# Patient Record
Sex: MALE | Race: BLACK OR AFRICAN AMERICAN | Employment: FULL TIME | URBAN - METROPOLITAN AREA
[De-identification: names, ages, dates, MRNs, and addresses within clinical notes are randomized per-mention and may not be internally consistent; named-entity substitution may affect disease eponyms.]

---

## 2021-03-10 ENCOUNTER — APPOINTMENT (OUTPATIENT)
Dept: GENERAL RADIOLOGY | Age: 31
End: 2021-03-10
Payer: COMMERCIAL

## 2021-03-10 ENCOUNTER — HOSPITAL ENCOUNTER (EMERGENCY)
Age: 31
Discharge: HOME | End: 2021-03-11
Payer: COMMERCIAL

## 2021-03-10 DIAGNOSIS — S62.664A CLOSED NONDISPLACED FRACTURE OF DISTAL PHALANX OF RIGHT RING FINGER, INITIAL ENCOUNTER: Primary | ICD-10-CM
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
## 2021-03-10 PROCEDURE — 99283 EMERGENCY DEPT VISIT LOW MDM: CPT

## 2021-03-10 PROCEDURE — 73630 X-RAY EXAM OF FOOT: CPT

## 2021-03-10 PROCEDURE — 73130 X-RAY EXAM OF HAND: CPT

## 2021-03-11 VITALS
TEMPERATURE: 97.3 F | RESPIRATION RATE: 16 BRPM | SYSTOLIC BLOOD PRESSURE: 129 MMHG | WEIGHT: 156 LBS | OXYGEN SATURATION: 98 % | BODY MASS INDEX: 23.11 KG/M2 | HEIGHT: 69 IN | HEART RATE: 67 BPM | DIASTOLIC BLOOD PRESSURE: 74 MMHG

## 2021-03-11 PROCEDURE — 6370000000 HC RX 637 (ALT 250 FOR IP): Performed by: NURSE PRACTITIONER

## 2021-03-11 RX ORDER — IBUPROFEN 800 MG/1
800 TABLET ORAL EVERY 8 HOURS PRN
Qty: 20 TABLET | Refills: 0 | Status: SHIPPED | OUTPATIENT
Start: 2021-03-11

## 2021-03-11 RX ORDER — IBUPROFEN 800 MG/1
800 TABLET ORAL ONCE
Status: COMPLETED | OUTPATIENT
Start: 2021-03-11 | End: 2021-03-11

## 2021-03-11 RX ADMIN — IBUPROFEN 800 MG: 800 TABLET, FILM COATED ORAL at 01:03

## 2021-03-11 ASSESSMENT — ENCOUNTER SYMPTOMS
SHORTNESS OF BREATH: 0
VOMITING: 0
CHEST TIGHTNESS: 0
DIARRHEA: 0
ABDOMINAL PAIN: 0
NAUSEA: 0

## 2021-03-11 NOTE — ED NOTES
Pt Discharged in stable condition, VSS, no signs of distress, discharge instructions and meds reviewed. Pt verbalizes understanding and states no further questions or concerns unaddressed.        Radames Riedel, RN  03/11/21 8203
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
## 2021-03-22 ENCOUNTER — TELEPHONE (OUTPATIENT)
Dept: ORTHOPEDIC SURGERY | Age: 31
End: 2021-03-22

## 2021-03-24 ENCOUNTER — OFFICE VISIT (OUTPATIENT)
Dept: ORTHOPEDIC SURGERY | Age: 31
End: 2021-03-24
Payer: COMMERCIAL

## 2021-03-24 VITALS — BODY MASS INDEX: 22.48 KG/M2 | HEIGHT: 70 IN | WEIGHT: 157 LBS | TEMPERATURE: 98 F

## 2021-03-24 DIAGNOSIS — S62.634A CLOSED MALLET FRACTURE OF DISTAL PHALANX OF RIGHT RING FINGER: Primary | ICD-10-CM

## 2021-03-24 PROCEDURE — 99203 OFFICE O/P NEW LOW 30 MIN: CPT | Performed by: ORTHOPAEDIC SURGERY

## 2021-03-24 NOTE — LETTER
[address] [place] [address]  Iggy Nichols [address] [place] [id number]  Phone: [phone]  Fax: [phone]    Celeste Brandt MD        March 24, 2021     Patient: Shavonne Pittman   YOB: 1990   Date of Visit: 3/24/2021       To Whom It May Concern: It is my medical opinion that Shavonne Pittman may work in splint up until day of surgery (date TBD). If you have any questions or concerns, please don't hesitate to call.     Sincerely,           Celeste Brandt MD

## 2021-03-24 NOTE — PROGRESS NOTES
This 36 y.o.  right hand dominant  is seen in referral for the ED at [facility] with a chief complaint of injury to their right ring finger, which was injured 2 weeks ago when caught between a box and a pallet. He noticed pain, swelling and deformity. He was evaluated at the [facility] and the patient was splinted and referred for hand/upper extremity evaluation and treatment. Evidently there was some difficulty scheduling his appointment due to a problem with his worker's compensation claim. There is no history of additional significant injury. Symptoms have not changed since the date of injury. The pain assessment has been reviewed and is correct. The patient's social history, past medical history, family history, medications, allergies and review of systems, entered 3/24/21,  have been reviewed, and dated and are recorded in the chart. On physical examination the patient is Height: 5' 10\" (177.8 cm) tall and weighs Weight: 157 lb (71.2 kg). Respirations are 18 per minute. The patient is well nourished, is oriented to time and place, demonstrates appropriate mood and affect as well as normal gait and station. There is mild soft tissue swelling present about the right ring finger. There is no discoloration. There is a mallet finger deformity. Tenderness is present on palpation in the area of the right ring finger, dorsal, DIP joint. Active  extension of the ring finger is limited only on the right and is accompanied by pain. Skin is intact, as is distal circulation and sensation. Gross muscle strength is limited only on the right   Hand and wrist joints are stable. There are no subcutaneous nodules or enlarged epitrochlear lymph nodes. Xrays: Review of outside Xrays of the right hand demonstrate a displaced large fragment mallet fracture of the ring finger.   Suboptimal positioning makes radiological evaluation somewhat difficult. Impression: Displaced large fragment mallet fracture right ring finger. The nature of this medical problem is fully discussed with the patient, including all treatment options. All questions are answered. The finger is splinted in full extension with a dorsal padded aluminum splint. Post splinting AP and lateral Xrays of the right ring finger done in the office today, demonstrate that unfortunately the DIP joint has subluxed. The Xrays are shown to the patient. The nature of their medical problem is again fully discussed with the patient, including all treatment options. Surgery is also discussed, including the possible risks, complications, prognosis and postoperative care. All questions are answered. The surgery consent form is explained and signed. Surgery will be scheduled and the patient is asked to call me if there are any additional questions. Furthermore, the patient is advised that surgery to repair their injury may not result in return of normal function. However, the result of repairing the injury will usually result in better function than not repairing the injury. The patient understands that the surgery will be done by Dr. Ismael Hussein.

## 2021-03-24 NOTE — LETTER
[facility] - HAND SURGERY  Surgery Scheduling Form:      DEMOGRAPHICS:                                                                                                                Patient Name:  Abilio Tejada  Patient :  1990   Patient SS#:  [id number]    Patient Phone:  [phone] (home)    Patient Address:  [address] [place] [address]    PCP:  No primary care provider on file. Insurance:  WORKERS COMP CLAIM    Insurance ID Number:   [id number].O.I.   [id number]  DIAGNOSIS & PROCEDURE:                                                                                              Diagnosis:   right ring finger Distal Phalanx displaced large fragment Mallet Fracture with joint subluxation  (816.00)     J88.139Z  Operation:  Open Reduction and Internal Fixation of right ring finger Displaced Large Fragment Phalanx Mallet Fracture with joint subluxation  [CPT: 18870]  Location:     [employer]  Surgeon:  Bhargav Taylor    SCHEDULING INFORMATION:                                                                                         .  Surgeon's Scheduling Instruction:  within 7 days    RN Post-op Appt:  [] Yes   [x] No  Preferred Thursday:   [] Yes   [] No    Requested Date:    OR Time:  2:00 Patient Arrival Time:  12:00    OR Time Required:  45  Minutes  Anesthesia:  General                              COVID:    RAPID  Equipment: BIOMET HAND FRACTURE SYSTEM, K-Wires, TPS  Mini C-Arm:  Yes   Standard C-Arm:  No  Status:  outpatient  PAT Required:  Yes  Comments:                      Leigh Bains. Travon Donato MD  3/24/21   BILLING INFORMATION:                                                                                                     Procedure:       CPT Code Modifier  Open Reduction and Internal Fixation of right ring finger Displaced Large Fragment Phalanx Mallet Fracture with joint subluxation        .  91694        Pre Operative Physician Prophylaxis Orders - SCIP Protocols      Pre-Operative Antibiotic Order:    No Known Allergies       []  ----  No Antibiotic Ordered       [x]  ----  Give the following Antibiotic within 1 hour prior to start time:         Ancef 1 gram IV if patient is less than 200 pounds    or       Ancef 2 grams IV if patient is greater than 200 pounds    or      Vancomycin 1 gram IV (over 1 hour) if patient is allergic to           PENICILLINS or CEFALOSPORINS        SURG  4-1                        COVID   RAPID            H&P    USE ATTACHED ER REPORT    Procedure:  Open Reduction and Internal Fixation of right ring finger Displaced Large Fragment Phalanx Mallet Fracture with joint subluxation      Patient: Lilian Re  :    1990    Physician Signature:     Date: 3/24/21  Time: 3:22 PM

## 2021-03-24 NOTE — LETTER
CONSENT TO OPERATION  AND/OR OTHER PROCEDURE(S)          PATIENT : Lilian Grimaldo   YOB: 1990      DATE : 3/24/21          1. I request and consent that Dr. Germán Feldman. Kurt Quinn,  and/or his associates or assistants perform an operation and/or procedures on the above patient at  [place] [address], to treat the condition(s) which appear indicated by the diagnostic studies already performed. I have been told that in general terms the nature, purpose and reasonable expectations of the operation and/or procedure(s) are:     Open Reduction Internal Fixation Displaced/Subluxed  Mallet Fracture Right Ring Finger      2. It has been explained to me by the informing physician that during the course of the operation and/or procedure(s) unforeseen conditions may be revealed that necessitate an extension of the original operation and/or procedure(s) or different operation and/or procedures than those set forth in Paragraph 1. I therefore authorize and request that my physician and/or his associates or assistants perform such operations and/or procedures as are necessary and desirable in the exercise of professional judgment. The authority granted under this Paragraph 2 shall extend to all conditions that require treatment and are known to my physician at the time the operation is commenced. 3. I have been made aware by the informing physician of certain risks and consequences that are associated with the operation and/or procedure(s) described in Paragraph 1, the reasonable alternative methods or treatment, the possible consequences, the possibility that the operation and/or procedure(s) may be unsuccessful and the possibility of complications.   I understand the reasonably known risks to be:      - Bleeding  - Infection  - Poor Healing  - Possible Damage to Nerve, Vessel, Tendon/Muscle or Bone  - Need for further Treatment/Surgery  - Stiffness  - Pain  - Residual or Recurrent Symptoms  - Anesthetic and/or Medical Risks  - We have discussed the specific limitations and risks of hospital and/or office based treatment at this time due to the COVID-19 pandemic                I have been counseled about the risks of [facility] Covid-19 in the denis-operative and post-operative periods related to this procedure. I have been made aware that [facility] Covid-19 around the time of a surgical procedure may worsen my prognosis for recovering from the virus and lend to a higher morbidity and or mortality risk. With this knowledge, I have requested to proceed with the procedure as scheduled. 4. I have also been informed by the informing physician that there are other risks from both known and unknown causes that are attendant to the performance of any surgical procedure. I am aware that the practice of medicine and surgery is not an exact science, and that no guarantees have been made to me concerning the results of the operation and/or procedure(s). 5. I   CONSENT / REFUSE CONSENT  (strike the phrase that does not apply) to the taking of photographs before, during and/or after the operation or procedure for scientific/educational purposes. 6. I consent to the administration of anesthesia and to the use of such anesthetics as may be deemed advisable by the anesthesiologist who has been engaged by me or my physician.     7. I certify that I have read and understand the above consent to operation and/or other procedure(s); that the explanations therein referred to were made to me by the informing physician in advance of my signing this consent; that all blanks or statements requiring insertion or completion were filled in and inapplicable paragraphs, if any, were stricken before I signed; and that all questions asked by me about the operation and/or procedure(s) which I have consented to have been fully answered in a satisfactory manner.                                 _______________________ 3/24/21                              Witness     Signature Of Patient         Date        Lavell Thibodeaux                                                 Informing Physician                                           Signature of Informing Physician                              If patient is unable to sign or is a minor, complete one of the following:    (A)  Patient is a minor   years of age. (B)  Patient is unable to sign because: The undersigned represents that he or she is duly authorized to execute this consent for and on behalf of the above named patient. Witness               o  Parent  o  Guardian   o  Spouse       o  Other (specify)                                                          Patient Name: Shavonne Pittman  Patient YOB: 1990  Dr. Sudeep Carrillo' Return To Work Policy  Regarding your ability to return to work after surgery or injury, Dr. Sudeep Carrillo will not state that any patient is off of work or cannot work at all. He will place you on restrictions after your surgical procedure or injury. This means that you will be allowed to return to work the day after your office visit or surgery with restrictions. Depending on the details of your particular situation, Dr. Sudeep Carrillo may state that you will have either light use or no use of your hand for a specific number of weeks. It is your obligation to communicate with your employer regarding your restrictions. It is your employer's decision as to whether they will accommodate your restrictions (i.e. allow you to come to work in your restricted capacity) or to not allow you to return to work under your restrictions. Dr. Sudeep Carrillo does not participate in making this decision and cannot influence your employer regarding their decision. If you do not communicate your restrictions to your employer, or if you do not present to work as you are scheduled to, Dr. Sudeep Carrillo will not provide an 'excuse' to explain your absence.   A doctors

## 2021-03-30 ENCOUNTER — ANESTHESIA EVENT (OUTPATIENT)
Dept: OPERATING ROOM | Age: 31
End: 2021-03-30
Payer: COMMERCIAL

## 2021-03-30 NOTE — PROGRESS NOTES
Phone message left to call PAT dept at [phone]  for history review and surgery instructions on 3/30/21 @ 2311

## 2021-03-31 NOTE — PROGRESS NOTES
[facility] time____0600________        Surgery time___0730_________    Take the following medications with a sip of water: Follow your MD/Surgeons pre-procedure instructions regarding your medications    Do not eat or drink anything after 12:00 midnight prior to your surgery. This includes water chewing gum, mints and ice chips. You may brush your teeth and gargle the morning of your surgery, but do not swallow the water     Please see your family doctor/pediatrician for a history and physical and/or concerning medications. Bring any test results/reports from your physicians office. If you are under the care of a heart doctor or specialist doctor, please be aware that you may be asked to them for clearance    You may be asked to stop blood thinners such as Coumadin, Plavix, Fragmin, Lovenox, etc., or any anti-inflammatories such as:  Aspirin, Ibuprofen, Advil, Naproxen prior to your surgery. We also ask that you stop any OTC medications such as fish oil, vitamin E, glucosamine, garlic, Multivitamins, COQ 10, etc.    We ask that you do not smoke 24 hours prior to surgery  We ask that you do not  drink any alcoholic beverages 24 hours prior to surgery     You must make arrangements for a responsible adult to take you home after your surgery. For your safety you will not be allowed to leave alone or drive yourself home. Your surgery will be cancelled if you do not have a ride home. Also for your safety, it is strongly suggested that someone stay with you the first 24 hours after your surgery. A parent or legal guardian must accompany a child scheduled for surgery and plan to stay at the hospital until the child is discharged. Please do not bring other children with you. For your comfort, please wear simple loose fitting clothing to the hospital.  Please do not bring valuables.     Do not wear any make-up or nail polish on your fingers or toes      For your safety, please do not wear any jewelry or body piercing's on the day of surgery. All jewelry must be removed. If you have dentures, they will be removed before going to operating room. For your convenience, we will provide you with a container. If you wear contact lenses or glasses, they will be removed, please bring a case for them. If you have a living will and a durable power of  for healthcare, please bring in a copy. As part of our patient safety program to minimize surgical site infections, we ask you to do the following:    · Please notify your surgeon if you develop any illness between         now and the  day of your surgery. · This includes a cough, cold, fever, sore throat, nausea,         or vomiting, and diarrhea, etc.  ·  Please notify your surgeon if you experience dizziness, shortness         of breath or blurred vision between now and the time of your surgery. Do not shave your operative site 96 hours prior to surgery. For face and neck surgery, men may use an electric razor 48 hours   prior to surgery. You may shower the night before surgery or the morning of   your surgery with an antibacterial soap. You will need to bring a photo ID and insurance card    [facility] has an onsite pharmacy, would you like to utilize our pharmacy     If you will be staying overnight and use a C-pap machine, please bring   your C-pap to hospital     Our goal is to provide you with excellent care, therefore, visitors will be limited to two(2) in the room at a time so that we may focus on providing this care for you. Please contact pre-admission testing if you have any further questions. [facility] phone number:  [address] PAT fax number:  [phone]  Please note these are generalized instructions for all surgical cases, you may be provided with more specific instructions according to your surgery.

## 2021-03-31 NOTE — PROGRESS NOTES

## 2021-04-01 ENCOUNTER — APPOINTMENT (OUTPATIENT)
Dept: GENERAL RADIOLOGY | Age: 31
End: 2021-04-01
Attending: ORTHOPAEDIC SURGERY
Payer: COMMERCIAL

## 2021-04-01 ENCOUNTER — ANESTHESIA (OUTPATIENT)
Dept: OPERATING ROOM | Age: 31
End: 2021-04-01
Payer: COMMERCIAL

## 2021-04-01 ENCOUNTER — HOSPITAL ENCOUNTER (OUTPATIENT)
Age: 31
Setting detail: OUTPATIENT SURGERY
Discharge: HOME | End: 2021-04-01
Attending: ORTHOPAEDIC SURGERY | Admitting: ORTHOPAEDIC SURGERY
Payer: COMMERCIAL

## 2021-04-01 VITALS
TEMPERATURE: 97 F | OXYGEN SATURATION: 98 % | SYSTOLIC BLOOD PRESSURE: 122 MMHG | BODY MASS INDEX: 22.48 KG/M2 | WEIGHT: 157 LBS | RESPIRATION RATE: 16 BRPM | HEIGHT: 70 IN | HEART RATE: 60 BPM | DIASTOLIC BLOOD PRESSURE: 70 MMHG

## 2021-04-01 VITALS
OXYGEN SATURATION: 99 % | RESPIRATION RATE: 7 BRPM | TEMPERATURE: 96.8 F | SYSTOLIC BLOOD PRESSURE: 109 MMHG | DIASTOLIC BLOOD PRESSURE: 55 MMHG

## 2021-04-01 DIAGNOSIS — S62.634A CLOSED MALLET FRACTURE OF DISTAL PHALANX OF RIGHT RING FINGER: Primary | ICD-10-CM

## 2021-04-01 LAB — SARS-COV-2, NAAT: NOT DETECTED

## 2021-04-01 PROCEDURE — 3700000000 HC ANESTHESIA ATTENDED CARE: Performed by: ORTHOPAEDIC SURGERY

## 2021-04-01 PROCEDURE — 6360000002 HC RX W HCPCS: Performed by: NURSE ANESTHETIST, CERTIFIED REGISTERED

## 2021-04-01 PROCEDURE — 2500000003 HC RX 250 WO HCPCS: Performed by: NURSE ANESTHETIST, CERTIFIED REGISTERED

## 2021-04-01 PROCEDURE — 7100000010 HC PHASE II RECOVERY - FIRST 15 MIN: Performed by: ORTHOPAEDIC SURGERY

## 2021-04-01 PROCEDURE — 6360000002 HC RX W HCPCS: Performed by: ORTHOPAEDIC SURGERY

## 2021-04-01 PROCEDURE — C1713 ANCHOR/SCREW BN/BN,TIS/BN: HCPCS | Performed by: ORTHOPAEDIC SURGERY

## 2021-04-01 PROCEDURE — 2580000003 HC RX 258: Performed by: ORTHOPAEDIC SURGERY

## 2021-04-01 PROCEDURE — 7100000000 HC PACU RECOVERY - FIRST 15 MIN: Performed by: ORTHOPAEDIC SURGERY

## 2021-04-01 PROCEDURE — 3209999900 FLUORO FOR SURGICAL PROCEDURES

## 2021-04-01 PROCEDURE — 6360000002 HC RX W HCPCS: Performed by: ANESTHESIOLOGY

## 2021-04-01 PROCEDURE — 3600000005 HC SURGERY LEVEL 5 BASE: Performed by: ORTHOPAEDIC SURGERY

## 2021-04-01 PROCEDURE — 2580000003 HC RX 258: Performed by: ANESTHESIOLOGY

## 2021-04-01 PROCEDURE — 7100000011 HC PHASE II RECOVERY - ADDTL 15 MIN: Performed by: ORTHOPAEDIC SURGERY

## 2021-04-01 PROCEDURE — 2709999900 HC NON-CHARGEABLE SUPPLY: Performed by: ORTHOPAEDIC SURGERY

## 2021-04-01 PROCEDURE — 7100000001 HC PACU RECOVERY - ADDTL 15 MIN: Performed by: ORTHOPAEDIC SURGERY

## 2021-04-01 PROCEDURE — 6370000000 HC RX 637 (ALT 250 FOR IP): Performed by: ANESTHESIOLOGY

## 2021-04-01 PROCEDURE — 87635 SARS-COV-2 COVID-19 AMP PRB: CPT

## 2021-04-01 PROCEDURE — 73140 X-RAY EXAM OF FINGER(S): CPT

## 2021-04-01 PROCEDURE — 3600000015 HC SURGERY LEVEL 5 ADDTL 15MIN: Performed by: ORTHOPAEDIC SURGERY

## 2021-04-01 PROCEDURE — 3700000001 HC ADD 15 MINUTES (ANESTHESIA): Performed by: ORTHOPAEDIC SURGERY

## 2021-04-01 DEVICE — IMPLANTABLE DEVICE: Type: IMPLANTABLE DEVICE | Site: HAND | Status: FUNCTIONAL

## 2021-04-01 DEVICE — WIRE FIXATION .045IN 5IN C-WIRE SS SPADE: Type: IMPLANTABLE DEVICE | Site: HAND | Status: FUNCTIONAL

## 2021-04-01 RX ORDER — FENTANYL CITRATE 50 UG/ML
25 INJECTION, SOLUTION INTRAMUSCULAR; INTRAVENOUS EVERY 5 MIN PRN
Status: DISCONTINUED | OUTPATIENT
Start: 2021-04-01 | End: 2021-04-01 | Stop reason: HOSPADM

## 2021-04-01 RX ORDER — DIPHENHYDRAMINE HYDROCHLORIDE 50 MG/ML
25 INJECTION INTRAMUSCULAR; INTRAVENOUS ONCE
Status: COMPLETED | OUTPATIENT
Start: 2021-04-01 | End: 2021-04-01

## 2021-04-01 RX ORDER — SODIUM CHLORIDE 0.9 % (FLUSH) 0.9 %
10 SYRINGE (ML) INJECTION PRN
Status: DISCONTINUED | OUTPATIENT
Start: 2021-04-01 | End: 2021-04-01 | Stop reason: HOSPADM

## 2021-04-01 RX ORDER — ONDANSETRON 2 MG/ML
INJECTION INTRAMUSCULAR; INTRAVENOUS PRN
Status: DISCONTINUED | OUTPATIENT
Start: 2021-04-01 | End: 2021-04-01 | Stop reason: SDUPTHER

## 2021-04-01 RX ORDER — PROPOFOL 10 MG/ML
INJECTION, EMULSION INTRAVENOUS PRN
Status: DISCONTINUED | OUTPATIENT
Start: 2021-04-01 | End: 2021-04-01 | Stop reason: SDUPTHER

## 2021-04-01 RX ORDER — SODIUM CHLORIDE 0.9 % (FLUSH) 0.9 %
10 SYRINGE (ML) INJECTION EVERY 12 HOURS SCHEDULED
Status: DISCONTINUED | OUTPATIENT
Start: 2021-04-01 | End: 2021-04-01 | Stop reason: HOSPADM

## 2021-04-01 RX ORDER — OXYCODONE HYDROCHLORIDE AND ACETAMINOPHEN 5; 325 MG/1; MG/1
1 TABLET ORAL PRN
Status: COMPLETED | OUTPATIENT
Start: 2021-04-01 | End: 2021-04-01

## 2021-04-01 RX ORDER — FENTANYL CITRATE 50 UG/ML
INJECTION, SOLUTION INTRAMUSCULAR; INTRAVENOUS PRN
Status: DISCONTINUED | OUTPATIENT
Start: 2021-04-01 | End: 2021-04-01 | Stop reason: SDUPTHER

## 2021-04-01 RX ORDER — MIDAZOLAM HYDROCHLORIDE 1 MG/ML
INJECTION INTRAMUSCULAR; INTRAVENOUS PRN
Status: DISCONTINUED | OUTPATIENT
Start: 2021-04-01 | End: 2021-04-01 | Stop reason: SDUPTHER

## 2021-04-01 RX ORDER — OXYCODONE HYDROCHLORIDE AND ACETAMINOPHEN 5; 325 MG/1; MG/1
2 TABLET ORAL PRN
Status: COMPLETED | OUTPATIENT
Start: 2021-04-01 | End: 2021-04-01

## 2021-04-01 RX ORDER — SODIUM CHLORIDE 9 MG/ML
INJECTION, SOLUTION INTRAVENOUS CONTINUOUS
Status: DISCONTINUED | OUTPATIENT
Start: 2021-04-01 | End: 2021-04-01 | Stop reason: HOSPADM

## 2021-04-01 RX ORDER — ONDANSETRON 2 MG/ML
4 INJECTION INTRAMUSCULAR; INTRAVENOUS
Status: DISCONTINUED | OUTPATIENT
Start: 2021-04-01 | End: 2021-04-01 | Stop reason: HOSPADM

## 2021-04-01 RX ORDER — HYDROCODONE BITARTRATE AND ACETAMINOPHEN 5; 325 MG/1; MG/1
1 TABLET ORAL EVERY 6 HOURS PRN
Qty: 20 TABLET | Refills: 0 | Status: SHIPPED | OUTPATIENT
Start: 2021-04-01 | End: 2021-04-08

## 2021-04-01 RX ORDER — MAGNESIUM HYDROXIDE 1200 MG/15ML
LIQUID ORAL CONTINUOUS PRN
Status: COMPLETED | OUTPATIENT
Start: 2021-04-01 | End: 2021-04-01

## 2021-04-01 RX ORDER — LIDOCAINE HYDROCHLORIDE 20 MG/ML
INJECTION, SOLUTION EPIDURAL; INFILTRATION; INTRACAUDAL; PERINEURAL PRN
Status: DISCONTINUED | OUTPATIENT
Start: 2021-04-01 | End: 2021-04-01 | Stop reason: SDUPTHER

## 2021-04-01 RX ORDER — MEPERIDINE HYDROCHLORIDE 25 MG/ML
12.5 INJECTION INTRAMUSCULAR; INTRAVENOUS; SUBCUTANEOUS ONCE
Status: COMPLETED | OUTPATIENT
Start: 2021-04-01 | End: 2021-04-01

## 2021-04-01 RX ORDER — DEXAMETHASONE SODIUM PHOSPHATE 4 MG/ML
INJECTION, SOLUTION INTRA-ARTICULAR; INTRALESIONAL; INTRAMUSCULAR; INTRAVENOUS; SOFT TISSUE PRN
Status: DISCONTINUED | OUTPATIENT
Start: 2021-04-01 | End: 2021-04-01 | Stop reason: SDUPTHER

## 2021-04-01 RX ADMIN — SODIUM CHLORIDE: 9 INJECTION, SOLUTION INTRAVENOUS at 07:04

## 2021-04-01 RX ADMIN — FENTANYL CITRATE 50 MCG: 50 INJECTION INTRAMUSCULAR; INTRAVENOUS at 07:44

## 2021-04-01 RX ADMIN — MEPERIDINE HYDROCHLORIDE 12.5 MG: 25 INJECTION INTRAMUSCULAR; INTRAVENOUS; SUBCUTANEOUS at 08:40

## 2021-04-01 RX ADMIN — MIDAZOLAM 2 MG: 1 INJECTION INTRAMUSCULAR; INTRAVENOUS at 07:22

## 2021-04-01 RX ADMIN — CEFAZOLIN 2000 MG: 10 INJECTION, POWDER, FOR SOLUTION INTRAVENOUS at 07:22

## 2021-04-01 RX ADMIN — DIPHENHYDRAMINE HYDROCHLORIDE 25 MG: 50 INJECTION, SOLUTION INTRAMUSCULAR; INTRAVENOUS at 10:07

## 2021-04-01 RX ADMIN — FENTANYL CITRATE 50 MCG: 50 INJECTION INTRAMUSCULAR; INTRAVENOUS at 07:39

## 2021-04-01 RX ADMIN — HYDROMORPHONE HYDROCHLORIDE 0.5 MG: 1 INJECTION, SOLUTION INTRAMUSCULAR; INTRAVENOUS; SUBCUTANEOUS at 08:44

## 2021-04-01 RX ADMIN — OXYCODONE HYDROCHLORIDE AND ACETAMINOPHEN 2 TABLET: 5; 325 TABLET ORAL at 10:07

## 2021-04-01 RX ADMIN — ONDANSETRON 4 MG: 2 INJECTION INTRAMUSCULAR; INTRAVENOUS at 07:50

## 2021-04-01 RX ADMIN — HYDROMORPHONE HYDROCHLORIDE 0.5 MG: 1 INJECTION, SOLUTION INTRAMUSCULAR; INTRAVENOUS; SUBCUTANEOUS at 08:39

## 2021-04-01 RX ADMIN — HYDROMORPHONE HYDROCHLORIDE 0.5 MG: 1 INJECTION, SOLUTION INTRAMUSCULAR; INTRAVENOUS; SUBCUTANEOUS at 08:52

## 2021-04-01 RX ADMIN — PROPOFOL 250 MG: 10 INJECTION, EMULSION INTRAVENOUS at 07:27

## 2021-04-01 RX ADMIN — HYDROMORPHONE HYDROCHLORIDE 0.5 MG: 1 INJECTION, SOLUTION INTRAMUSCULAR; INTRAVENOUS; SUBCUTANEOUS at 08:32

## 2021-04-01 RX ADMIN — DEXAMETHASONE SODIUM PHOSPHATE 8 MG: 4 INJECTION, SOLUTION INTRAMUSCULAR; INTRAVENOUS at 07:31

## 2021-04-01 RX ADMIN — LIDOCAINE HYDROCHLORIDE 80 MG: 20 INJECTION, SOLUTION EPIDURAL; INFILTRATION; INTRACAUDAL; PERINEURAL at 07:27

## 2021-04-01 RX ADMIN — FENTANYL CITRATE 50 MCG: 50 INJECTION INTRAMUSCULAR; INTRAVENOUS at 07:26

## 2021-04-01 ASSESSMENT — PULMONARY FUNCTION TESTS
PIF_VALUE: 8
PIF_VALUE: 7
PIF_VALUE: 8
PIF_VALUE: 3
PIF_VALUE: 2
PIF_VALUE: 3
PIF_VALUE: 8
PIF_VALUE: 8
PIF_VALUE: 18
PIF_VALUE: 2
PIF_VALUE: 8
PIF_VALUE: 7
PIF_VALUE: 8
PIF_VALUE: 2
PIF_VALUE: 12

## 2021-04-01 ASSESSMENT — PAIN DESCRIPTION - PAIN TYPE
TYPE: SURGICAL PAIN
TYPE: SURGICAL PAIN

## 2021-04-01 ASSESSMENT — PAIN DESCRIPTION - FREQUENCY: FREQUENCY: CONTINUOUS

## 2021-04-01 ASSESSMENT — PAIN SCALES - GENERAL
PAINLEVEL_OUTOF10: 8

## 2021-04-01 ASSESSMENT — LIFESTYLE VARIABLES: SMOKING_STATUS: 0

## 2021-04-01 ASSESSMENT — PAIN - FUNCTIONAL ASSESSMENT: PAIN_FUNCTIONAL_ASSESSMENT: 0-10

## 2021-04-01 ASSESSMENT — ENCOUNTER SYMPTOMS: SHORTNESS OF BREATH: 0

## 2021-04-01 ASSESSMENT — PAIN DESCRIPTION - ORIENTATION
ORIENTATION: RIGHT
ORIENTATION: RIGHT

## 2021-04-01 ASSESSMENT — PAIN DESCRIPTION - PROGRESSION: CLINICAL_PROGRESSION: RAPIDLY WORSENING

## 2021-04-01 ASSESSMENT — PAIN DESCRIPTION - LOCATION: LOCATION: HAND

## 2021-04-01 NOTE — OP NOTE
OPERATIVE REPORT          Patient:  Connor Tejeda    YOB: 1990  Date of Service:  4/1/2021    [facility]  [address] [facility]    Preoperative Diagnosis:  Right Ring Finger Distal Phalanx articular Base unstable fracture      Postoperative Diagnosis:  Same      Procedure:  Open reduction and percutaneous pinning of Right Ring Finger Distal Phalanx Base fracture    Surgeon:    Zoe Leger. Mary Ann Joaquin MD    Surgical Assistant:    EKATERINA Russo Assistant    Anesthesia:  General         Blood Loss: Minimal    Complications: None                                                           Indications:  Mr. Connor Tejeda  is a 36y.o. year old male with a Right Ring Finger Distal Phalanx articular Base fracture which is unstable. I have discussed preoperatively with him  the complications, limitations, expectations, alternatives, and risks of surgical care. Mr. Connor Tejeda has provided written informed consent to proceed. Procedure: After written consent was obtained and the proper site was identified and marked, Mr. Connor Tejeda  was brought to the operating room and placed in the supine position on the operating room table. The Right arm was extended upon a hand table. A dose of preoperative antibiotics was administered and the Right upper extremity was prepped and draped in the usual sterile fashion. A dorsal incision was fashioned over the distal interphalangeal joint of the Ring Finger  dissection was carried through the subcutaneous tissues exposing the extensor mechanism and the fracture. All interposed soft tissue and callus was excised allowing appropriate reduction of the fracture. Under fluoroscopic guidance, a reduction of the Distal Phalanx fracture was performed. one\" an 2 0.035\" K-wires were percutaneously placed securing the fracture fragments in anatomic alignment, assuring that there was no distraction or malrotation.  Final fluoroscopic images demonstrated anatomic alignment and appropriate reduction of the fracture fragments. The K-wires were checked for appropriate position and dimension. The wire ends were bent, cut short, & protective caps were applied. The skin was closed routinely. Local anesthestic was instilled for post-operative analgesia. The pin sites were dressed with Adaptic, dry sterile dressings, and a very well padded short arm fiberglass cast was applied incorporating the Middle Finger, Ring Finger and Small Finger in an intrinsic safe position was applied. Mr. Kameron Damon  was awakened from anesthesia without apparent complication and was returned to the recovery room in stable condition. At the conclusion of the procedure, all needles, instruments, and sponge counts were correct. Chetna Monge MD   4/1/2021 , 8:01 AM

## 2021-04-01 NOTE — PROGRESS NOTES
Arrives to ACU, drowsy, wakes to voice, vital signs stable, IV infusing without complications, c/o pain will medicate once tolerating PO.

## 2021-04-01 NOTE — PROGRESS NOTES
Remains drowsy, wakes to voice, falls back to sleep quickly, family encouraging PO intake, pt tolerated half of one bassam doone cookie and 2 drinks of apple juice.

## 2021-04-01 NOTE — PROGRESS NOTES
To pacu from OR. Pt asleep with oral airway in place. Dressing to right hand dry and intact. Right brachial pulse palpable. IV infusing. Monitor in sinus rhythm.

## 2021-04-01 NOTE — ANESTHESIA POSTPROCEDURE EVALUATION
Department of Anesthesiology  Postprocedure Note    Patient: Alix Al  MRN: [id number]  YOB: 1990  Date of evaluation: 4/1/2021  Time:  9:29 AM     Procedure Summary     Date: 04/01/21 Room / Location: [address]  / [facility]    Anesthesia Start: 2723 Anesthesia Stop: 0804    Procedure: OPEN REDUCTION AND INTERNAL FIXATION OF RIGHT RING FINGER DISPLACED LARGE FRAGMENT PHALANX MALLET FRACTURE WITH JOINT SUBLUXATION (Right ) Diagnosis: (RIGHT RING FINGER DISTAL PHALANX DISPLACED LARGE FRAGMENT MALLET FRACTURE WITH JOINT SUBLUXATION)    Surgeons: Angi Moe MD Responsible Provider: Lynn Carranza MD    Anesthesia Type: general ASA Status: 2          Anesthesia Type: general    Emily Phase I: Emily Score: 9    Emily Phase II: Emily Score: 10    Last vitals: Reviewed and per EMR flowsheets.        Anesthesia Post Evaluation    Patient location during evaluation: PACU  Patient participation: complete - patient participated  Level of consciousness: awake and alert  Airway patency: patent  Nausea & Vomiting: no nausea and no vomiting  Complications: no  Cardiovascular status: hemodynamically stable  Respiratory status: acceptable  Hydration status: stable
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
## 2021-04-02 NOTE — H&P
Pre-operative Update of H&P:    I  have seen & examined Mr. Krys Borges related solely to his hand and upper extremity conditions, prior to the scheduled procedure on the date of his surgery. The indications for the planned surgical procedure & and his upper-extremity condition are unchanged.

## 2021-04-07 ENCOUNTER — TELEPHONE (OUTPATIENT)
Dept: ORTHOPEDIC SURGERY | Age: 31
End: 2021-04-07

## 2021-04-09 ENCOUNTER — OFFICE VISIT (OUTPATIENT)
Dept: ORTHOPEDIC SURGERY | Age: 31
End: 2021-04-09
Payer: COMMERCIAL

## 2021-04-09 VITALS — BODY MASS INDEX: 22.48 KG/M2 | WEIGHT: 157 LBS | HEIGHT: 70 IN | RESPIRATION RATE: 16 BRPM

## 2021-04-09 DIAGNOSIS — S62.634A CLOSED MALLET FRACTURE OF DISTAL PHALANX OF RIGHT RING FINGER: Primary | ICD-10-CM

## 2021-04-09 PROCEDURE — 99024 POSTOP FOLLOW-UP VISIT: CPT | Performed by: ORTHOPAEDIC SURGERY

## 2021-04-09 PROCEDURE — 29075 APPL CST ELBW FNGR SHORT ARM: CPT | Performed by: ORTHOPAEDIC SURGERY

## 2021-04-09 NOTE — PROGRESS NOTES
Mr. Jonas Williamson returns today in follow-up of his recent right Ring Finger Mallet Fracture Repair which was done 1 week ago. He has noted varying discomfort and varying swelling. He notes no symptoms of numbness, tingling, no symptoms related to perfusion. Physical Exam:  Skin incisions & pin sites are healing well, without erythema, drainage or sign of infection. Digital range of motion is not assessed due to the presence of the un-healed fracture. Wrist range of motion is well preserved. Sensation is normal in the Ring Finger. Vascular examination reveals normal and good capillary refill. Swelling is mild. Clinical alignment and rotation are normal.      Radiographic Evaluation:  Radiographs were obtained today (2 views of the right  Ring Finger). They demonstrate good maintenance of alignment of the fracture fragments, no rotational deformity, & no interval healing of the fracture. There has not been evidence of hardware loosening or failure. The fracture does not appear to be healed at this time. Impression:  Mr. Jonas Williamson is doing well after recent right Ring Finger  mallet  Fracture Repair. It would appear that he has not fully healed his fracture. Plan:  Mr. Jonas Williamson is today returned to an appropriately applied well padded cast incorporating the necessary digits in an intrinsic-safe position. He is advised regarding the appropriate care of, wear, and use of this device. He is also instructed in  work on Active & Passive range of motion of the exposed digits & elbow. These modalities were demonstrated to him today. We discussed the continued restrictions on the use of the injured hand & wrist and the limitations on resumption of activities. We discussed the option of pursuing formalized hand therapy and a prescription  was not indicated.     I have asked Mr. Jonas Williamson to follow-up with me in approximately 4 weeks from now for re-evaluation and repeat radiographs out of splint/cast.      He is also specifically instructed to return to the office or call for an appointment sooner if his symptoms are changing or worsening prior to that time.

## 2021-04-09 NOTE — PROGRESS NOTES
I applied a Short Arm Fiberglass Cast incorporating the Middle Finger, Ring Finger and Small Finger in an intrinsic safe postion to George Nieves's Right, Middle Finger, Ring Finger, Small Finger. I applied casting stockinette,  1 rolls of padding in an overlapping fashion. George Nieves requested blue color fiberglass. I rolled 2 rolls of fiberglass in an overlapping fashion. His  Right, Middle Finger, Ring Finger, Small Finger was maintained in a 90 degree Flexion. At the conclusion of the procedure, George Nieves's nail beds were pink in color, the extremity is warm to the touch. Capillary refill is less than 2 seconds. Michell Evans was instructed in proper care of cast.  Do not get wet, keep all items out of cast.  If cast is painful please make appointment to get checked. He was also briefed on circulation compromise. If digits are cold, blue, and tingling patient must must seek care. If after hours patient is to go to Emergency Room. During office hours patient must come in to office.

## 2021-04-20 ENCOUNTER — TELEPHONE (OUTPATIENT)
Dept: ORTHOPEDIC SURGERY | Age: 31
End: 2021-04-20

## 2021-04-28 ENCOUNTER — TELEPHONE (OUTPATIENT)
Dept: ORTHOPEDIC SURGERY | Age: 31
End: 2021-04-28

## 2021-04-28 NOTE — LETTER
[address] [facility] - HAND SURGERY  Surgery Scheduling Form:      DEMOGRAPHICS:                                                                                                                Patient Name:  Virginia Genao  Patient :  1990   Patient SS#:  [id number]    Patient Phone:  [phone] (home)    Patient Address:  [address] [place] [address]    PCP:  No primary care provider on file. Insurance:  WORKERS COMP CLAIM    Insurance ID Number:   [id number].O.I.   [id number]  DIAGNOSIS & PROCEDURE:                                                                                              Diagnosis:   right ring finger Distal Phalanx displaced large fragment Mallet Fracture with joint subluxation  (816.00)     W97.919V  Operation:  Open Reduction and Internal Fixation of right ring finger Displaced Large Fragment Phalanx Mallet Fracture with joint subluxation  [CPT: 84632]  Location:     [place]  Surgeon:  Roverto Levin    SCHEDULING INFORMATION:                                                                                         .  Surgeon's Scheduling Instruction:  within 7 days    RN Post-op Appt:  [] Yes   [x] No  Preferred Thursday:   [] Yes   [] No    Requested Date:    OR Time:  2:00 Patient Arrival Time:  12:00    OR Time Required:  45  Minutes  Anesthesia:  General                              COVID:    RAPID  Equipment: BIOMET HAND FRACTURE SYSTEM, K-Wires, TPS  Mini C-Arm:  Yes   Standard C-Arm:  No  Status:  outpatient  PAT Required:  Yes  Comments:                      Wesley Real. Cristine Fabian MD  3/24/21   BILLING INFORMATION:                                                                                                     Procedure:       CPT Code Modifier  Open Reduction and Internal Fixation of right ring finger Displaced Large Fragment Phalanx Mallet Fracture with joint subluxation        .  82791        Pre Operative Physician Prophylaxis Orders - SCIP Protocols      Pre-Operative Antibiotic Order:    No Known Allergies       []  ----  No Antibiotic Ordered       [x]  ----  Give the following Antibiotic within 1 hour prior to start time:         Ancef 1 gram IV if patient is less than 200 pounds    or       Ancef 2 grams IV if patient is greater than 200 pounds    or      Vancomycin 1 gram IV (over 1 hour) if patient is allergic to           PENICILLINS or CEFALOSPORINS        SURG  4-1                        COVID   RAPID            H&P    USE ATTACHED ER REPORT    Procedure:  Open Reduction and Internal Fixation of right ring finger Displaced Large Fragment Phalanx Mallet Fracture with joint subluxation      Patient: Ophelia Nata  :    1990    Physician Signature:     Date: 3/24/21  Time: 3:22 PM

## 2021-06-03 ENCOUNTER — OFFICE VISIT (OUTPATIENT)
Dept: ORTHOPEDIC SURGERY | Age: 31
End: 2021-06-03

## 2021-06-03 VITALS — BODY MASS INDEX: 22.48 KG/M2 | WEIGHT: 157 LBS | HEIGHT: 70 IN | RESPIRATION RATE: 16 BRPM

## 2021-06-03 DIAGNOSIS — S62.634A CLOSED MALLET FRACTURE OF DISTAL PHALANX OF RIGHT RING FINGER: Primary | ICD-10-CM

## 2021-06-03 PROCEDURE — 99024 POSTOP FOLLOW-UP VISIT: CPT | Performed by: ORTHOPAEDIC SURGERY

## 2021-06-03 NOTE — PROGRESS NOTES
The patient has had no difficulties and voices no complaints. The patient's social history, past medical history, family history, medications, allergies and review of systems have been reviewed, dated 4/9/21 and are recorded in the chart. The splint is removed. Skin is in good condition. There is no swelling. There is no significant deformity and no tenderness is noted over the fracture site. Xrays:  AP, lateral and oblique Xrays of the right ring finger, done in the office today, demonstrate apparent healing of the fracture and continued reduction of the dislocation    The finger is cleaned with Betadine and alcohol and the K wires are removed. K wires are removed. The finger si dressed and splinted. He is instructed about activity precautions, splint use, skin care and a vwery gentlerange of motion exercise program, which is fully discussed and demonstrated. The patient is fully advised regarding activities, precautions and a home program of range of motion exercises, which is fully discussed and demonstrated. The patient is given a return appointment for 2 weeks for repeat Xrays and range of motion check and is instructed to call sooner if there are any questions or problems.

## 2021-06-18 ENCOUNTER — OFFICE VISIT (OUTPATIENT)
Dept: ORTHOPEDIC SURGERY | Age: 31
End: 2021-06-18

## 2021-06-18 VITALS — WEIGHT: 157 LBS | BODY MASS INDEX: 22.48 KG/M2 | RESPIRATION RATE: 16 BRPM | HEIGHT: 70 IN

## 2021-06-18 DIAGNOSIS — S62.634A CLOSED MALLET FRACTURE OF DISTAL PHALANX OF RIGHT RING FINGER: Primary | ICD-10-CM

## 2021-06-18 PROCEDURE — 99024 POSTOP FOLLOW-UP VISIT: CPT | Performed by: ORTHOPAEDIC SURGERY

## 2021-06-18 NOTE — PATIENT INSTRUCTIONS
Hand Range of Motion Instructions      Dr. Azam Mcelroy    1. Be cautions in resuming fulll activities and use of the hand for next 2 - 4 weeks. 2. Perform the following exercises VIGOROUSLY at least four times a day. Exercises should be performed in the seated position with elbow on tabletop or other firm surface. If you cannot make these motions on your own, you may use other hand to assist in making these motions. A. Fully straighten fingers until hand is flat. Fully bend fingers until hand is in a full fist.   B. Bend wrist forward and backward (grasp hand around knuckles with other hand to do so). C. Rotate forearm so that your palm faces towards your face. Rotate forearm so that your palm faces away from your face (grasp hand around wrist with other hand to do so). D. Fully straighten elbow. Fully bend elbow. 3. Continue light use of the hand progressing to more normal us as it feels comfortable to do so. 4. In 2 - 4 weeks you may discontinue using the brace (if you were using one) and resume normal use of the hand and wrist if you have regained full and painless motion and function. 5. If you are unable to achieve  full and painless motion and function over 4 weeks, please call the office at [phone] to schedule a follow-up appointment with Dr. Xiang Tran. Thank you for choosing [facility] Physicians for your Hand and Upper Extremity needs. If we can be of any further assistance to you, please do not hesitate to contact us.     Office Phone Number:  [phone]  or  [phone]

## 2021-06-18 NOTE — PROGRESS NOTES
Mr. Connor Tejeda returns today in follow-up of his recent right Ring Finger mallet fracture injury which I repaired surgically approximately 10 weeks ago. His pinning was removed 2 weeks ago. He has noted varying burning discomfort and decreased swelling. He notes no symptoms of numbness, tingling, no symptoms related to perfusion. I have today reviewed with Connor Tejeda the clinically relevant, past medical history, medications, allergies,  family history, social history, and Review Of Systems & I have documented any details relevant to today's presenting complaints in my history above. Mr. Alberta Nieves's self-reported past medical history, medications, allergies,  family history, social history, and Review Of Systems have been scanned into the chart under the \"Media\" tab. Physical Exam:  Vitals  Resp: 16  Height: 5' 10\" (177.8 cm)  Weight: 157 lb (71.2 kg)  Mr. Connor Tejeda appears well, he is in no apparent distress, he demonstrates appropriate mood & affect. Skin: (after immobilization is removed) Skin: Normal in appearance, Normal Color and Free of Lesions Bilaterally    Digits: right Ring Finger DIP joint shows moderated residual stiffness but remains clinically reduced . Other Fingers & Thumb show Full bilaterally. There is not residual tenderness at the injury site  Wrist range of motion is equal bilateral   There is no evidence of gross joint instability bilaterally. Sensation is normal bilaterally  Vascular examination reveals normal and good capillary refill bilaterally  Swelling is mild in the injured  Ring Finger on the Right, normal on the Left  Muscular strength is clinically appropriate bilaterally. Examination for Carpal Tunnel Syndrome shows Carpal Tunnel Compression Test to be Mildly Positive on the right & Negative on the left. The patient displays mild baseline symptoms to potentially confound the exam.  The thenar musculature is not atrophied & weakened.     Examination for Stenosing Tenosynovitis demonstrates no evidence of tenderness, thickening or nodularity at the A-1 pulleys of the digits bilaterally. There no palpable triggering or any finger or thumb. Impression:  Mr. Selam Albarado is doing fairly well after recent right Ring Finger Mallet injury. It would appear that he has healed his injury. He may have some median nerve compression symptoms. Plan:  Mr. Selam Albarado is instructed in weaning from his splint and continued Night Splinting for the next one month. He is also instructed in work on Active & Passive range of motion of the digits, wrist, & elbow. These modalities were demonstrated to him today. We discussed the continued restrictions on the use of the injured hand and the limitations on resumption of activities until full range of motion and comfort have been regained. I have explained the time course and likely expectations for maximal recovery of motion and function. We discussed the option of pursuing formalized hand therapy and a prescription  was offered and declined by the patient. I have asked Mr. Selam Albarado to follow-up with me by either scheduling an appointment for 4 weeks from now or by calling me at that time if he has not been able to regain full painless range of motion and functional use of the injured extremity. If symptoms do not improve with time, we may consider ordering  Electrodiagnostic Studies     He is also specifically instructed to return to the office or call for an appointment sooner if his symptoms are changing or worsening prior to that time.

## 2021-06-18 NOTE — LETTER
[facility]  [facility] [address] [address]  Phone: [phone]  Fax: [phone]    Carolyn Valero MD        June 18, 2021     Patient: Korina May   YOB: 1990   Date of Visit: 6/18/2021       To Whom It May Concern: It is my medical opinion that Korina May may return to full duty immediately with no restrictions. Must be able to wear splint. If you have any questions or concerns, please don't hesitate to call.     Sincerely,              Carolyn Valero MD

## 2021-06-28 ENCOUNTER — TELEPHONE (OUTPATIENT)
Dept: ORTHOPEDIC SURGERY | Age: 31
End: 2021-06-28

## 2021-06-28 NOTE — TELEPHONE ENCOUNTER
Received a call from [address] [place] [address] a [employer] 14 be completed for 6/18/2021 office visit

## 2021-06-29 ENCOUNTER — TELEPHONE (OUTPATIENT)
Dept: ORTHOPEDIC SURGERY | Age: 31
End: 2021-06-29

## 2021-06-29 DIAGNOSIS — S62.634A CLOSED MALLET FRACTURE OF DISTAL PHALANX OF RIGHT RING FINGER: Primary | ICD-10-CM

## 2021-06-29 NOTE — TELEPHONE ENCOUNTER
Spoke with patient. States that he doesn't have the return to work note that he was given in 6/18/21. Asked if we could reprint it for him but also wants an appt to be seen because he feels his hand is not where is should be and has trouble holding things. I told him that I would have to talk to Dr. Carmen Marc staff about getting him scheduled when they are back in the office tomorrow.

## 2021-06-29 NOTE — TELEPHONE ENCOUNTER
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  ph. [phone]

## 2021-06-30 NOTE — TELEPHONE ENCOUNTER
Called and spoke with pt. Rx for therapy is entered. Will talk with CB[employer] after lunch about changing his work note.

## 2021-06-30 NOTE — TELEPHONE ENCOUNTER
Called and got a busy signal.    [employer] will not change work note.  Pt is protected while wearing splint and can go full duty

## 2023-05-12 ENCOUNTER — HOSPITAL ENCOUNTER (EMERGENCY)
Age: 33
Discharge: HOME | End: 2023-05-12
Attending: EMERGENCY MEDICINE
Payer: MEDICAID

## 2023-05-12 VITALS
WEIGHT: 154.76 LBS | TEMPERATURE: 98 F | HEART RATE: 98 BPM | SYSTOLIC BLOOD PRESSURE: 112 MMHG | RESPIRATION RATE: 18 BRPM | BODY MASS INDEX: 22.16 KG/M2 | HEIGHT: 70 IN | DIASTOLIC BLOOD PRESSURE: 69 MMHG | OXYGEN SATURATION: 98 %

## 2023-05-12 DIAGNOSIS — R68.89 FLU-LIKE SYMPTOMS: Primary | ICD-10-CM

## 2023-05-12 LAB
FLUAV RNA UPPER RESP QL NAA+PROBE: NEGATIVE
FLUBV AG NPH QL: NEGATIVE
SARS-COV-2 RDRP RESP QL NAA+PROBE: NOT DETECTED

## 2023-05-12 PROCEDURE — 99283 EMERGENCY DEPT VISIT LOW MDM: CPT

## 2023-05-12 PROCEDURE — 87635 SARS-COV-2 COVID-19 AMP PRB: CPT

## 2023-05-12 PROCEDURE — 87804 INFLUENZA ASSAY W/OPTIC: CPT

## 2023-05-12 ASSESSMENT — PAIN - FUNCTIONAL ASSESSMENT: PAIN_FUNCTIONAL_ASSESSMENT: 0-10

## 2023-05-12 ASSESSMENT — PAIN DESCRIPTION - LOCATION: LOCATION: ABDOMEN

## 2023-05-12 ASSESSMENT — PAIN SCALES - GENERAL: PAINLEVEL_OUTOF10: 5
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
## 2023-05-12 NOTE — ED NOTES
Discharge and education instructions reviewed. Patient verbalized understanding, teach-back successful. Patient denied questions at this time. No acute distress noted. Patient instructed to follow-up as noted - return to emergency department if symptoms worsen. Patient verbalized understanding. Discharged per EDMD with discharge instructions.         Mercedes Costello RN  05/12/23 0045

## (undated) DEVICE — TUBING, SUCTION, 1/4" X 12', STRAIGHT: Brand: MEDLINE

## (undated) DEVICE — GOWN,SIRUS,POLYRNF,BRTHSLV,XL,30/CS: Brand: MEDLINE

## (undated) DEVICE — UNDERGLOVE SURG SZ 8 FNGR THK0.21MIL GRN LTX BEAD CUF

## (undated) DEVICE — DRAPE HND W114XL142IN BLU POLYPR W O PCH FEN CRD AND TB HLDR

## (undated) DEVICE — MINOR SET UP PK

## (undated) DEVICE — BANDAGE COMPR W4INXL12FT E DISP ESMARCH EVEN

## (undated) DEVICE — PADDING UNDERCAST W4INXL4YD 100% COT CRIMPED FINISH WBRL II

## (undated) DEVICE — SINGLE USE DEVICE INTENDED TO COVER EXPOSED ENDS OF ORTHOPEDIC PIN AND K-WIRES TO HELP PROTECT THE EXPOSED WIRE FROM SNAGGING ON CLOTHING.: Brand: OXBORO™ PIN COVER

## (undated) DEVICE — GLOVE SURG SZ 65 CRM LTX FREE POLYISOPRENE POLYMER BEAD ANTI

## (undated) DEVICE — COVER LT HNDL BLU PLAS

## (undated) DEVICE — SOLUTION IV IRRIG 250ML ST LF 0.9% SODIUM 2F7122

## (undated) DEVICE — SYRINGE MED 10ML LUERLOCK TIP W/O SFTY DISP

## (undated) DEVICE — STRIP,CLOSURE,WOUND,MEDI-STRIP,1/2X4: Brand: MEDLINE

## (undated) DEVICE — BANDAGE COMPR W2INXL5YD TAN BRTH SELF ADH WRP W/ HND TEAR

## (undated) DEVICE — GLOVE SURG SZ 75 CRM LTX FREE POLYISOPRENE POLYMER BEAD ANTI

## (undated) DEVICE — SUTURE CHROMIC GUT SZ 4-0 L27IN ABSRB BRN FS-2 L19MM 3/8 635H

## (undated) DEVICE — Z DISCONTINUED PER MEDLINE (LOW STOCK)  USE 2422770 DRAPE C ARM W54XL78IN FOR FLROSCN

## (undated) DEVICE — BANDAGE COMPR W4INXL15FT BGE E SGL LAYERED CLP CLSR

## (undated) DEVICE — GLOVE SURG SZ 65 L12IN FNGR THK79MIL GRN LTX FREE

## (undated) DEVICE — SPLINT ORTH W4XL15IN LAYERED FBRGLS FOAM PD BRTH BK MOLD

## (undated) DEVICE — GOWN SIRUS NONREIN XL W/TWL: Brand: MEDLINE INDUSTRIES, INC.

## (undated) DEVICE — SHEET,DRAPE,53X77,STERILE: Brand: MEDLINE

## (undated) DEVICE — DRESSING PETRO W3XL3IN OIL EMUL N ADH GZ KNIT IMPREG CELOS

## (undated) DEVICE — ZIMMER® STERILE DISPOSABLE TOURNIQUET CUFF WITH PLC, DUAL PORT, SINGLE BLADDER, 18 IN. (46 CM)

## (undated) DEVICE — DECANTER FLD 9IN ST BG FOR ASEP TRNSF OF FLD

## (undated) DEVICE — SPONGE GZ W4XL4IN COT 12 PLY TYP VII WVN C FLD DSGN

## (undated) DEVICE — APPLICATOR MEDICATED 26 CC SOLUTION HI LT ORNG CHLORAPREP